# Patient Record
Sex: MALE | Race: WHITE | NOT HISPANIC OR LATINO | URBAN - NONMETROPOLITAN AREA
[De-identification: names, ages, dates, MRNs, and addresses within clinical notes are randomized per-mention and may not be internally consistent; named-entity substitution may affect disease eponyms.]

---

## 2021-08-16 ENCOUNTER — IMPORTED ENCOUNTER (OUTPATIENT)
Dept: URBAN - NONMETROPOLITAN AREA CLINIC 1 | Facility: CLINIC | Age: 66
End: 2021-08-16

## 2021-08-16 PROBLEM — H25.813: Noted: 2021-08-16

## 2021-08-16 PROCEDURE — 92004 COMPRE OPH EXAM NEW PT 1/>: CPT

## 2021-08-16 NOTE — PATIENT DISCUSSION
Cataract(s)-Visually significant cataract OU .-Cataract(s) causing symptomatic impairment of visual function not correctable with a tolerable change in glasses or contact lenses lighting or non-operative means resulting in specific activity limitations and/or participation restrictions including but not limited to reading viewing television driving or meeting vocational or recreational needs. -Expectation is clearer vision and functional improvement in symptoms as well as reduced glare disability after cataract removal.-Order IOLMaster and OPD today. -Recommend Vivity LRI OD/Vivity TORIC OS  based on today's OPD testing and lifestyle questionnaire.-All questions were answered regarding surgery including pre and post-op medications appointments activity restrictions and anesthetic usage.-The risks benefits and alternatives and special risk factors for the patient were discussed in detail including but not limited to: bleeding infection retinal detachment vitreous loss problems with the implant and possible need for additional surgery.-Although rare the possibility of complete vision loss was discussed.-The possible need for glasses post-operatively was discussed.-Order medical clearance exam based on history of diabetes-Patient elects to proceed with cataract surgery OD . Will schedule at patient's convenience and re-evaluate OS  in the future. Based off todays testing he qualifies for LRI OD/ toric OS  and discussed lens options w/ benefits. Discussed Vivity w/ realistic [facility] expectations @ distance near and intermediate and advised need for mild otc readers @ times. Start AT and Lotemax TID OU x 1 week and RTC to repeat robby OU. Pt elects LenSx OU Post op inflammation anticipated discussed dextenza insertion after surgery.  Pt elects LenSx

## 2021-09-16 PROBLEM — K21.9: Noted: 2021-09-16

## 2021-09-16 PROBLEM — E11.9: Noted: 2021-09-16

## 2021-09-16 PROBLEM — Z01.818: Noted: 2021-09-16

## 2021-09-22 ENCOUNTER — IMPORTED ENCOUNTER (OUTPATIENT)
Dept: URBAN - NONMETROPOLITAN AREA CLINIC 1 | Facility: CLINIC | Age: 66
End: 2021-09-22

## 2021-09-22 PROBLEM — Z01.818: Noted: 2021-09-22

## 2021-09-22 PROBLEM — E11.9: Noted: 2021-09-22

## 2021-09-22 PROBLEM — K21.9: Noted: 2021-09-22

## 2021-10-08 ENCOUNTER — IMPORTED ENCOUNTER (OUTPATIENT)
Dept: URBAN - NONMETROPOLITAN AREA CLINIC 1 | Facility: CLINIC | Age: 66
End: 2021-10-08

## 2021-10-08 PROBLEM — Z98.41: Noted: 2021-10-08

## 2021-10-08 PROCEDURE — V2788 PRESBYOPIA-CORRECT FUNCTION: HCPCS

## 2021-10-08 PROCEDURE — 66982 XCAPSL CTRC RMVL CPLX WO ECP: CPT

## 2021-10-08 PROCEDURE — 0356T INSERTION OF DRUG-ELUTING IMPLANT (INCLUDING PUNCTAL DILATION AND IMPLANT REMOVAL WHEN PERFORMED) INTO LACRIMAL CANALICULUS, EACH: CPT

## 2021-10-08 PROCEDURE — 99024 POSTOP FOLLOW-UP VISIT: CPT

## 2021-10-08 PROCEDURE — 92136 OPHTHALMIC BIOMETRY: CPT

## 2021-10-08 NOTE — PATIENT DISCUSSION
s/p PCIOL-Pt doing well s/p PCIOL. -Continue post-op gtts according to instruction sheet and sleep with eye shield over eye for 7 nights.-Avoid bending at the waist lifting anything over 5lbs and dirty or kimberly environments. -RTC .

## 2021-10-13 ENCOUNTER — IMPORTED ENCOUNTER (OUTPATIENT)
Dept: URBAN - NONMETROPOLITAN AREA CLINIC 1 | Facility: CLINIC | Age: 66
End: 2021-10-13

## 2021-10-13 PROBLEM — Z98.41: Noted: 2021-10-13

## 2021-10-13 PROBLEM — H25.812: Noted: 2021-10-13

## 2021-10-13 PROCEDURE — 99024 POSTOP FOLLOW-UP VISIT: CPT

## 2021-10-13 NOTE — PATIENT DISCUSSION
Cataract(s)-Visually significant cataract OS. -Cataract(s) causing symptomatic impairment of visual function not correctable with a tolerable change in glasses or contact lenses lighting or non-operative means resulting in specific activity limitations and/or participation restrictions including but not limited to reading viewing television driving or meeting vocational or recreational needs. -Expectation is clearer vision and functional improvement in symptoms as well as reduced glare disability after cataract removal.-Recommend Vivity Toric IOL based on previous OPD testing and lifestyle questionnaire.-All questions were answered regarding surgery including pre and post-op medications appointments activity restrictions and anesthetic usage.-The risks benefits and alternatives and special risk factors for the patient were discussed in detail including but not limited to: bleeding infection retinal detachment vitreous loss problems with the implant and possible need for additional surgery.-Although rare the possibility of complete vision loss was discussed.-The possible need for glasses post-operatively was discussed.-Order medical clearance exam based on history of -Patient elects to proceed with cataract surgery OS.s/p PCIOL-Pt doing well at 1 week s/p PCIOL. -Continue post-op gtts according to instruction sheet.-Okay to resume usual activites and d/c eye shield.

## 2021-10-22 ENCOUNTER — IMPORTED ENCOUNTER (OUTPATIENT)
Dept: URBAN - NONMETROPOLITAN AREA CLINIC 1 | Facility: CLINIC | Age: 66
End: 2021-10-22

## 2021-10-22 PROBLEM — Z98.41: Noted: 2021-10-13

## 2021-10-22 PROBLEM — Z98.42: Noted: 2021-10-22

## 2021-10-22 PROCEDURE — 0356T INSERTION OF DRUG-ELUTING IMPLANT (INCLUDING PUNCTAL DILATION AND IMPLANT REMOVAL WHEN PERFORMED) INTO LACRIMAL CANALICULUS, EACH: CPT

## 2021-10-22 PROCEDURE — V2788 PRESBYOPIA-CORRECT FUNCTION: HCPCS

## 2021-10-22 PROCEDURE — 99024 POSTOP FOLLOW-UP VISIT: CPT

## 2021-10-22 PROCEDURE — 66982 XCAPSL CTRC RMVL CPLX WO ECP: CPT

## 2021-10-22 PROCEDURE — 92136 OPHTHALMIC BIOMETRY: CPT

## 2021-10-22 NOTE — PATIENT DISCUSSION
Same day POV CE OS (10/22/2021) OD (10/8/2021) Patience Fletcher/Trad dextenza- Discussed diagnosis in detail with patient. - Patient doing well and stable. - Continue post op drops as directed. - Continue to monitor. 1 week POV with Dr. Huseyin Ly
Partially impaired: cannot see medication labels or newsprint, but can see obstacles in path, and the surrounding layout; can count fingers at arm's length

## 2022-04-09 ASSESSMENT — VISUAL ACUITY
OS_CC: 20/50
OS_CC: 20/200
OS_AM: 20/30
OD_CC: 20/40
OS_AM: 20/30
OD_PH: 20/30-2
OD_GLARE: 20/100
OD_PAM: 20/40
OD_SC: 20/100
OD_GLARE: 20/100
OS_CC: 20/70
OS_GLARE: 20/60
OS_AM: 20/30
OS_CC: 20/70
OD_PAM: 20/40
OS_GLARE: 20/60
OS_GLARE: 20/60
OS_SC: 20/50
OD_SC: 20/100
OS_AM: 20/30
OS_GLARE: 20/60
OS_AM: 20/30
OS_SC: 20/50
OD_CC: 20/200-

## 2022-04-09 ASSESSMENT — TONOMETRY
OS_IOP_MMHG: 19
OS_IOP_MMHG: 18
OD_IOP_MMHG: 11
OD_IOP_MMHG: 20
OD_IOP_MMHG: 14
OD_IOP_MMHG: 13
OS_IOP_MMHG: 12
OS_IOP_MMHG: 13